# Patient Record
Sex: FEMALE | Race: WHITE | Employment: STUDENT | URBAN - METROPOLITAN AREA
[De-identification: names, ages, dates, MRNs, and addresses within clinical notes are randomized per-mention and may not be internally consistent; named-entity substitution may affect disease eponyms.]

---
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
## 2018-02-08 ENCOUNTER — TELEPHONE (OUTPATIENT)
Dept: INTERNAL MEDICINE CLINIC | Facility: CLINIC | Age: 18
End: 2018-02-08

## 2018-02-08 NOTE — TELEPHONE ENCOUNTER
Spoke with pt's mother stating pt admitted to going to clinic and getting IUD and now experiencing severe abdominal pain. Advised should go to ER for further eval- imaging. Mother verbalized understanding and agreed with POC. MORA

## 2018-02-08 NOTE — TELEPHONE ENCOUNTER
Pt's Mom Devora Cain, on HIPPA, is calling to say Pt has been experiencing severe pain, lower abdominal. Pain level about 8-9, unable to get up. No fever that they know of.

## 2018-08-21 ENCOUNTER — TELEPHONE (OUTPATIENT)
Dept: INTERNAL MEDICINE CLINIC | Facility: CLINIC | Age: 18
End: 2018-08-21

## 2018-08-21 NOTE — TELEPHONE ENCOUNTER
Pts mother Rj Lozada (ok per hippa) called and asked if the pt would be able to get a refill on the Antibiotic that she was prescribed at her last OV 10/18/17 due to the same symptoms and rash that she had before at that visit.      They are currently out of s

## 2018-08-22 NOTE — TELEPHONE ENCOUNTER
LOV: 10/18/17  Clindamycin HCl 300 MG Oral Cap Take 1 capsule (300 mg total) by mouth 4 (four) times daily. Disp: 28 capsule Rfl: 0   Chlorhexidine Gluconate 4 % External Liquid Apply 30 mL topically daily as needed.  Disp: 118 mL Rfl: 1     Called mother b

## 2018-08-22 NOTE — TELEPHONE ENCOUNTER
Called and spoke to pt's mother, ([id number] Lanie Guzman per HIPAA) who preferred to schedule OV with [facility].   Scheduled 9/5 at 4:15 PM.

## 2018-09-05 ENCOUNTER — OFFICE VISIT (OUTPATIENT)
Dept: INTERNAL MEDICINE CLINIC | Facility: CLINIC | Age: 18
End: 2018-09-05
Payer: MEDICAID

## 2018-09-05 VITALS
DIASTOLIC BLOOD PRESSURE: 60 MMHG | SYSTOLIC BLOOD PRESSURE: 110 MMHG | BODY MASS INDEX: 22.65 KG/M2 | TEMPERATURE: 98 F | HEIGHT: 70 IN | RESPIRATION RATE: 16 BRPM | HEART RATE: 54 BPM | WEIGHT: 158.19 LBS

## 2018-09-05 DIAGNOSIS — L73.9 FOLLICULITIS: Primary | ICD-10-CM

## 2018-09-05 PROCEDURE — 87070 CULTURE OTHR SPECIMN AEROBIC: CPT | Performed by: FAMILY MEDICINE

## 2018-09-05 PROCEDURE — 99213 OFFICE O/P EST LOW 20 MIN: CPT | Performed by: FAMILY MEDICINE

## 2018-09-05 PROCEDURE — 87205 SMEAR GRAM STAIN: CPT | Performed by: FAMILY MEDICINE

## 2018-09-05 PROCEDURE — 87147 CULTURE TYPE IMMUNOLOGIC: CPT | Performed by: FAMILY MEDICINE

## 2018-09-05 RX ORDER — CIPROFLOXACIN 500 MG/1
500 TABLET, FILM COATED ORAL 2 TIMES DAILY
Qty: 14 TABLET | Refills: 0 | Status: SHIPPED | OUTPATIENT
Start: 2018-09-05 | End: 2018-09-12

## 2018-09-05 NOTE — PROGRESS NOTES
HPI:    Patient ID: Radha Zepeda is a 21year old female. HPI Here with continued rash on buttocks. Has had for years. Has taken Clindamycin, Augmentin, topical erythromycin and nothing has helped.  Putting on night cream for face on her buttocks for

## 2018-09-17 ENCOUNTER — TELEPHONE (OUTPATIENT)
Dept: INTERNAL MEDICINE CLINIC | Facility: CLINIC | Age: 18
End: 2018-09-17

## 2018-09-17 NOTE — TELEPHONE ENCOUNTER
Patient's mother Ricardo Enriquez calling back, advised that skin culture showed typical skin bacteria, abx treatment should have helped, given the reoccurrence of issue for the past few years, recommend that patient establishes care with derm out in [address].   Mom verbal

## 2018-09-17 NOTE — TELEPHONE ENCOUNTER
Called pt's mother back (Sharri Ludwig per HIPAA) who stated pt's rash is better but not completely gone- denies new symptoms or worsening but is curious if there is something else she could take to help heal it. Continue to monitor or refer pt to derm?   Please advi

## 2018-09-17 NOTE — TELEPHONE ENCOUNTER
Pt's Mom Manolo Vale. On [employer] would like a call back to discuss the culture that was done on Pt's rash. What will be the next step? The rash is not all the way gone although better after the antibiotics. Should she go see derm?

## 2018-09-17 NOTE — TELEPHONE ENCOUNTER
Please call patient's mom. Culture of rash just showed some typical skin bacteria- nothing that would change the abx we gave.  I did talk with patient since this has been going on for \"years\" that patient may benefit from seeing Dermatology at this point

## 2018-11-19 ENCOUNTER — TELEPHONE (OUTPATIENT)
Dept: INTERNAL MEDICINE CLINIC | Facility: CLINIC | Age: 18
End: 2018-11-19

## 2018-11-19 DIAGNOSIS — L73.9 FOLLICULITIS: Primary | ICD-10-CM

## 2018-11-19 NOTE — TELEPHONE ENCOUNTER
[facility]   Cc: [facility] Referral Pool   Phone Number: [phone]             .Reason for the order/referral: REFERRAL REQUEST   PCP: Aftab Fonseca MD   Refer to Provider (first and last name):  Claudio Jim MD

## 2021-12-22 ENCOUNTER — OFFICE VISIT (OUTPATIENT)
Dept: FAMILY MEDICINE CLINIC | Facility: CLINIC | Age: 21
End: 2021-12-22
Payer: COMMERCIAL

## 2021-12-22 VITALS
SYSTOLIC BLOOD PRESSURE: 122 MMHG | HEIGHT: 70 IN | TEMPERATURE: 98 F | RESPIRATION RATE: 16 BRPM | WEIGHT: 145 LBS | OXYGEN SATURATION: 99 % | BODY MASS INDEX: 20.76 KG/M2 | HEART RATE: 85 BPM | DIASTOLIC BLOOD PRESSURE: 78 MMHG

## 2021-12-22 DIAGNOSIS — J02.0 STREP THROAT: ICD-10-CM

## 2021-12-22 DIAGNOSIS — J02.9 SORE THROAT: Primary | ICD-10-CM

## 2021-12-22 PROCEDURE — 3074F SYST BP LT 130 MM HG: CPT | Performed by: NURSE PRACTITIONER

## 2021-12-22 PROCEDURE — 87880 STREP A ASSAY W/OPTIC: CPT | Performed by: NURSE PRACTITIONER

## 2021-12-22 PROCEDURE — 3008F BODY MASS INDEX DOCD: CPT | Performed by: NURSE PRACTITIONER

## 2021-12-22 PROCEDURE — 99213 OFFICE O/P EST LOW 20 MIN: CPT | Performed by: NURSE PRACTITIONER

## 2021-12-22 PROCEDURE — 3078F DIAST BP <80 MM HG: CPT | Performed by: NURSE PRACTITIONER

## 2021-12-22 RX ORDER — AMOXICILLIN 875 MG/1
875 TABLET, COATED ORAL 2 TIMES DAILY
Qty: 20 TABLET | Refills: 0 | Status: SHIPPED | OUTPATIENT
Start: 2021-12-22 | End: 2022-01-01

## 2021-12-22 NOTE — PROGRESS NOTES
CHIEF COMPLAINT:   Patient presents with:  Sore Throat: x 2 days      HPI:   Velma Lockett is a 19year old female presents to clinic with symptoms of sore throat. Patient has had for 2 days. Symptoms have been worsening since onset.   Patient reports fol clear to auscultation bilaterally, no wheezes or rhonchi. Breathing is non labored.   CARDIO: RRR without murmur  GI: good BS's,no masses, hepatosplenomegaly, or tenderness on direct palpation  EXTREMITIES: no cyanosis, clubbing or edema  LYMPH: pos anterio utensils, or by touching others after touching your mouth or nose.  Symptoms include throat pain that is worse with swallowing, aching all over, headache, swollen lymph nodes at the front of the neck, and red swollen tonsils sometimes with white patches and neck  · Lymph nodes getting larger or becoming soft in the middle  · You have trouble swallowing liquids or you can't open your mouth wide because of throat pain  · Signs of dehydration.  These include very dark urine or no urine, sunken eyes, and dizziness

## (undated) NOTE — MR AVS SNAPSHOT
[facility] [place]  [address] [facility] [id number] [phone]               Thank you for choosing us for your health care visit with Luis Ramachandran DO. We are glad to serve you and happy to provide you with this summary of your visit.   Pl Treatment options include:  · Warm compress. Putting a warm, wet washcloth on the inflamed skin may help. · Medicine. Many skin (topical) and oral medicines are available. Antibiotics are used for bacterial infections.  Antifungal medicines are best for fu Sores often go away in a few days with no treatment. In some cases, medication may be given. A small piece of skin or pus may be taken to find the type of bacteria causing the infection.   Home care  The health care provider may prescribe an antibiotic crea © 7914-4952 [address], [address] [place]. All rights reserved. This information is not intended as a substitute for professional medical care. Always follow your healthcare professional's instructions.              Al Healthy Diet and Regular Exercise  The [employer] of [address] [place], [place] [address] [facility] for making healthy food choices  -   Enjoy your food, but eat less. Fully enjoy your food when eating. Don’t eat while distracted and slow down. Avoid over sized portions.    Denice Sandoval

## (undated) NOTE — MR AVS SNAPSHOT
[facility] [place]  [address] [facility] [address]  [phone]               Thank you for choosing us for your health care visit with GERMAN Khan. We are glad to serve you and happy to provide you with this summary of your visit.   Ple help.  · Medicine. Many skin (topical) and oral medicines are available. Antibiotics are used for bacterial infections. Antifungal medicines are best for fungal infections. · Good hygiene. Keeping the skin clean can help. Use a clean razor when shaving.  P visit,  view other health information, and more. To sign up or find more information, go to [email or web address] and click on the Sign Up Now link in the [employer] box.      Enter your [employer] Activation Code exactly as it appears below along with yo